# Patient Record
Sex: FEMALE | URBAN - METROPOLITAN AREA
[De-identification: names, ages, dates, MRNs, and addresses within clinical notes are randomized per-mention and may not be internally consistent; named-entity substitution may affect disease eponyms.]

---

## 2020-03-26 ENCOUNTER — APPOINTMENT (OUTPATIENT)
Age: 20
Setting detail: DERMATOLOGY
End: 2020-03-27

## 2020-03-26 DIAGNOSIS — Z71.89 OTHER SPECIFIED COUNSELING: ICD-10-CM

## 2020-03-26 DIAGNOSIS — L29.8 OTHER PRURITUS: ICD-10-CM

## 2020-03-26 DIAGNOSIS — L50.3 DERMATOGRAPHIC URTICARIA: ICD-10-CM

## 2020-03-26 DIAGNOSIS — B35.0 TINEA BARBAE AND TINEA CAPITIS: ICD-10-CM

## 2020-03-26 PROCEDURE — OTHER MIPS QUALITY: OTHER

## 2020-03-26 PROCEDURE — OTHER COUNSELING: OTHER

## 2020-03-26 PROCEDURE — OTHER PRESCRIPTION: OTHER

## 2020-03-26 PROCEDURE — 99203 OFFICE O/P NEW LOW 30 MIN: CPT

## 2020-03-26 PROCEDURE — OTHER ORDER TESTS: OTHER

## 2020-03-26 PROCEDURE — OTHER REASSURANCE: OTHER

## 2020-03-26 RX ORDER — KETOCONAZOLE 20 MG/ML
SHAMPOO TOPICAL
Qty: 1 | Refills: 6 | Status: ERX | COMMUNITY
Start: 2020-03-26

## 2020-03-26 ASSESSMENT — LOCATION SIMPLE DESCRIPTION DERM
LOCATION SIMPLE: LEFT SCALP
LOCATION SIMPLE: LEFT FOREARM
LOCATION SIMPLE: RIGHT FOREARM
LOCATION SIMPLE: LEFT FOREHEAD

## 2020-03-26 ASSESSMENT — LOCATION DETAILED DESCRIPTION DERM
LOCATION DETAILED: RIGHT VENTRAL PROXIMAL FOREARM
LOCATION DETAILED: LEFT VENTRAL LATERAL PROXIMAL FOREARM
LOCATION DETAILED: LEFT SUPERIOR LATERAL FOREHEAD
LOCATION DETAILED: LEFT CENTRAL FRONTAL SCALP

## 2020-03-26 ASSESSMENT — LOCATION ZONE DERM
LOCATION ZONE: ARM
LOCATION ZONE: SCALP
LOCATION ZONE: FACE

## 2020-03-26 NOTE — PROCEDURE: MIPS QUALITY
Name And Contact Information For Health Care Proxy: Mariya Bone\\nMother\\[id number] Name And Contact Information For Health Care Proxy: Mariya Bone\\nMother\\[id number]

## 2020-04-27 ENCOUNTER — APPOINTMENT (OUTPATIENT)
Age: 20
Setting detail: DERMATOLOGY
End: 2020-04-28

## 2020-04-27 DIAGNOSIS — B35.0 TINEA BARBAE AND TINEA CAPITIS: ICD-10-CM

## 2020-04-27 DIAGNOSIS — L29.8 OTHER PRURITUS: ICD-10-CM

## 2020-04-27 PROCEDURE — OTHER MIPS QUALITY: OTHER

## 2020-04-27 PROCEDURE — 99213 OFFICE O/P EST LOW 20 MIN: CPT

## 2020-04-27 PROCEDURE — OTHER COUNSELING: OTHER

## 2020-04-27 PROCEDURE — OTHER REASSURANCE: OTHER

## 2020-04-27 ASSESSMENT — LOCATION SIMPLE DESCRIPTION DERM
LOCATION SIMPLE: LEFT SCALP
LOCATION SIMPLE: LEFT FOREHEAD

## 2020-04-27 ASSESSMENT — LOCATION DETAILED DESCRIPTION DERM
LOCATION DETAILED: LEFT CENTRAL FRONTAL SCALP
LOCATION DETAILED: LEFT SUPERIOR LATERAL FOREHEAD

## 2020-04-27 ASSESSMENT — LOCATION ZONE DERM
LOCATION ZONE: FACE
LOCATION ZONE: SCALP

## 2020-04-27 NOTE — PROCEDURE: MIPS QUALITY
Name And Contact Information For Health Care Proxy: Mariya Bone\\nMother\\[id number] Name And Contact Information For Health Care Proxy: Mariya Bone\\nMother\\[id number]

## 2020-05-11 ENCOUNTER — APPOINTMENT (OUTPATIENT)
Age: 20
Setting detail: DERMATOLOGY
End: 2020-05-18

## 2020-05-11 DIAGNOSIS — L30.9 DERMATITIS, UNSPECIFIED: ICD-10-CM

## 2020-05-11 DIAGNOSIS — L29.8 OTHER PRURITUS: ICD-10-CM

## 2020-05-11 PROCEDURE — OTHER COUNSELING: OTHER

## 2020-05-11 PROCEDURE — OTHER PATIENT SPECIFIC COUNSELING: OTHER

## 2020-05-11 PROCEDURE — OTHER MIPS QUALITY: OTHER

## 2020-05-11 PROCEDURE — 99213 OFFICE O/P EST LOW 20 MIN: CPT

## 2020-05-11 ASSESSMENT — LOCATION DETAILED DESCRIPTION DERM
LOCATION DETAILED: RIGHT ANTECUBITAL SKIN
LOCATION DETAILED: SUPERIOR THORACIC SPINE
LOCATION DETAILED: EPIGASTRIC SKIN
LOCATION DETAILED: RIGHT UPPER CUTANEOUS LIP

## 2020-05-11 ASSESSMENT — LOCATION SIMPLE DESCRIPTION DERM
LOCATION SIMPLE: RIGHT LIP
LOCATION SIMPLE: UPPER BACK
LOCATION SIMPLE: RIGHT UPPER ARM
LOCATION SIMPLE: ABDOMEN

## 2020-05-11 ASSESSMENT — LOCATION ZONE DERM
LOCATION ZONE: LIP
LOCATION ZONE: ARM
LOCATION ZONE: TRUNK

## 2020-05-11 NOTE — PROCEDURE: MIPS QUALITY
Name And Contact Information For Health Care Proxy: Mariya Bone\\nMother\\[id number] Name And Contact Information For Health Care Proxy: Mariya Bone\\nMother\\[id number]

## 2020-05-11 NOTE — PROCEDURE: PATIENT SPECIFIC COUNSELING
Detail Level: Zone
Advised to start 24 hour antihistamine daily, take shower once she gets home from work, keep skin well hydrated.

## 2020-06-10 ENCOUNTER — APPOINTMENT (OUTPATIENT)
Age: 20
Setting detail: DERMATOLOGY
End: 2020-06-11

## 2020-06-10 DIAGNOSIS — B35.0 TINEA BARBAE AND TINEA CAPITIS: ICD-10-CM

## 2020-06-10 DIAGNOSIS — L29.8 OTHER PRURITUS: ICD-10-CM

## 2020-06-10 PROCEDURE — OTHER PATIENT SPECIFIC COUNSELING: OTHER

## 2020-06-10 PROCEDURE — 99213 OFFICE O/P EST LOW 20 MIN: CPT

## 2020-06-10 PROCEDURE — OTHER COUNSELING: OTHER

## 2020-06-10 PROCEDURE — OTHER MIPS QUALITY: OTHER

## 2020-06-10 ASSESSMENT — LOCATION SIMPLE DESCRIPTION DERM
LOCATION SIMPLE: RIGHT LIP
LOCATION SIMPLE: ABDOMEN
LOCATION SIMPLE: UPPER BACK
LOCATION SIMPLE: RIGHT UPPER ARM

## 2020-06-10 ASSESSMENT — LOCATION ZONE DERM
LOCATION ZONE: ARM
LOCATION ZONE: LIP
LOCATION ZONE: TRUNK

## 2020-06-10 ASSESSMENT — LOCATION DETAILED DESCRIPTION DERM
LOCATION DETAILED: RIGHT ANTECUBITAL SKIN
LOCATION DETAILED: SUPERIOR THORACIC SPINE
LOCATION DETAILED: RIGHT UPPER CUTANEOUS LIP
LOCATION DETAILED: EPIGASTRIC SKIN

## 2020-06-10 NOTE — PROCEDURE: MIPS QUALITY
Name And Contact Information For Health Care Proxy: Mariya Bone\\nMother\\[id number] Name And Contact Information For Health Care Proxy: Mariya Bone\\nMother\\[id number]

## 2020-07-27 ENCOUNTER — APPOINTMENT (OUTPATIENT)
Age: 20
Setting detail: DERMATOLOGY
End: 2020-07-28

## 2020-07-27 DIAGNOSIS — L29.8 OTHER PRURITUS: ICD-10-CM

## 2020-07-27 DIAGNOSIS — B35.0 TINEA BARBAE AND TINEA CAPITIS: ICD-10-CM

## 2020-07-27 PROCEDURE — 99213 OFFICE O/P EST LOW 20 MIN: CPT

## 2020-07-27 PROCEDURE — OTHER MIPS QUALITY: OTHER

## 2020-07-27 PROCEDURE — OTHER COUNSELING: OTHER

## 2020-07-27 ASSESSMENT — LOCATION DETAILED DESCRIPTION DERM
LOCATION DETAILED: SUPERIOR THORACIC SPINE
LOCATION DETAILED: RIGHT UPPER CUTANEOUS LIP
LOCATION DETAILED: RIGHT ANTECUBITAL SKIN

## 2020-07-27 ASSESSMENT — LOCATION SIMPLE DESCRIPTION DERM
LOCATION SIMPLE: RIGHT LIP
LOCATION SIMPLE: RIGHT UPPER ARM
LOCATION SIMPLE: UPPER BACK

## 2020-07-27 ASSESSMENT — LOCATION ZONE DERM
LOCATION ZONE: TRUNK
LOCATION ZONE: ARM
LOCATION ZONE: LIP

## 2020-07-27 NOTE — PROCEDURE: MIPS QUALITY
Name And Contact Information For Health Care Proxy: Mariya Bone\\nMother\\[id number] Name And Contact Information For Health Care Proxy: Mariya Bone\\nMother\\[id number]

## 2021-01-27 ENCOUNTER — APPOINTMENT (OUTPATIENT)
Age: 21
Setting detail: DERMATOLOGY
End: 2021-01-27

## 2021-01-27 DIAGNOSIS — B35.0 TINEA BARBAE AND TINEA CAPITIS: ICD-10-CM

## 2021-01-27 DIAGNOSIS — Z71.89 OTHER SPECIFIED COUNSELING: ICD-10-CM

## 2021-01-27 DIAGNOSIS — L29.8 OTHER PRURITUS: ICD-10-CM

## 2021-01-27 PROCEDURE — OTHER MIPS QUALITY: OTHER

## 2021-01-27 PROCEDURE — OTHER COUNSELING: OTHER

## 2021-01-27 PROCEDURE — OTHER TREATMENT REGIMEN: OTHER

## 2021-01-27 PROCEDURE — 99213 OFFICE O/P EST LOW 20 MIN: CPT

## 2021-01-27 ASSESSMENT — SEVERITY ASSESSMENT: SEVERITY: CLEAR

## 2021-01-27 ASSESSMENT — LOCATION DETAILED DESCRIPTION DERM
LOCATION DETAILED: SUPERIOR THORACIC SPINE
LOCATION DETAILED: RIGHT ANTECUBITAL SKIN
LOCATION DETAILED: RIGHT UPPER CUTANEOUS LIP

## 2021-01-27 ASSESSMENT — LOCATION SIMPLE DESCRIPTION DERM
LOCATION SIMPLE: RIGHT UPPER ARM
LOCATION SIMPLE: UPPER BACK
LOCATION SIMPLE: RIGHT LIP

## 2021-01-27 ASSESSMENT — LOCATION ZONE DERM
LOCATION ZONE: ARM
LOCATION ZONE: LIP
LOCATION ZONE: TRUNK

## 2021-01-27 NOTE — PROCEDURE: MIPS QUALITY
Name And Contact Information For Health Care Proxy: Mariya Bone\\nMother\\[id number] Name And Contact Information For Health Care Proxy: Mariya Bone\\nMother\\[id number]